# Patient Record
Sex: FEMALE | Race: WHITE | Employment: OTHER | URBAN - METROPOLITAN AREA
[De-identification: names, ages, dates, MRNs, and addresses within clinical notes are randomized per-mention and may not be internally consistent; named-entity substitution may affect disease eponyms.]

---

## 2017-03-21 ENCOUNTER — HOSPITAL ENCOUNTER (OUTPATIENT)
Dept: MRI IMAGING | Facility: CLINIC | Age: 82
Discharge: HOME | End: 2017-03-21
Attending: INTERNAL MEDICINE | Admitting: INTERNAL MEDICINE
Payer: MEDICARE

## 2017-03-21 DIAGNOSIS — C85.99 OCULAR LYMPHOMA (H): ICD-10-CM

## 2017-03-21 LAB
CREAT BLD-MCNC: 1 MG/DL (ref 0.52–1.04)
GFR SERPL CREATININE-BSD FRML MDRD: 52 ML/MIN/1.7M2

## 2017-03-21 PROCEDURE — 25500064 ZZH RX 255 OP 636: Performed by: INTERNAL MEDICINE

## 2017-03-21 PROCEDURE — A9585 GADOBUTROL INJECTION: HCPCS | Performed by: INTERNAL MEDICINE

## 2017-03-21 PROCEDURE — 70553 MRI BRAIN STEM W/O & W/DYE: CPT

## 2017-03-21 PROCEDURE — 82565 ASSAY OF CREATININE: CPT

## 2017-03-21 RX ORDER — GADOBUTROL 604.72 MG/ML
5 INJECTION INTRAVENOUS ONCE
Status: COMPLETED | OUTPATIENT
Start: 2017-03-21 | End: 2017-03-21

## 2017-03-21 RX ADMIN — GADOBUTROL 5 ML: 604.72 INJECTION INTRAVENOUS at 20:16

## 2019-01-22 ENCOUNTER — RECORDS - HEALTHEAST (OUTPATIENT)
Dept: LAB | Facility: CLINIC | Age: 84
End: 2019-01-22

## 2019-01-22 LAB
ALBUMIN UR-MCNC: ABNORMAL MG/DL
APPEARANCE UR: ABNORMAL
BACTERIA #/AREA URNS HPF: ABNORMAL HPF
BILIRUB UR QL STRIP: NEGATIVE
CAOX CRY #/AREA URNS HPF: PRESENT /[HPF]
COLOR UR AUTO: YELLOW
GLUCOSE UR STRIP-MCNC: NEGATIVE MG/DL
HGB UR QL STRIP: NEGATIVE
KETONES UR STRIP-MCNC: NEGATIVE MG/DL
LEUKOCYTE ESTERASE UR QL STRIP: NEGATIVE
MUCOUS THREADS #/AREA URNS LPF: ABNORMAL LPF
NITRATE UR QL: NEGATIVE
PH UR STRIP: 5.5 [PH] (ref 4.5–8)
RBC #/AREA URNS AUTO: ABNORMAL HPF
SP GR UR STRIP: 1.02 (ref 1–1.03)
SQUAMOUS #/AREA URNS AUTO: ABNORMAL LPF
UROBILINOGEN UR STRIP-ACNC: ABNORMAL
WBC #/AREA URNS AUTO: ABNORMAL HPF

## 2019-01-23 LAB — BACTERIA SPEC CULT: NO GROWTH

## 2019-05-30 ENCOUNTER — APPOINTMENT (OUTPATIENT)
Dept: MRI IMAGING | Facility: CLINIC | Age: 84
DRG: 840 | End: 2019-05-30
Attending: EMERGENCY MEDICINE
Payer: COMMERCIAL

## 2019-05-30 ENCOUNTER — HOSPITAL ENCOUNTER (INPATIENT)
Facility: CLINIC | Age: 84
LOS: 1 days | Discharge: HOSPICE | DRG: 840 | End: 2019-05-31
Attending: EMERGENCY MEDICINE | Admitting: INTERNAL MEDICINE
Payer: COMMERCIAL

## 2019-05-30 ENCOUNTER — APPOINTMENT (OUTPATIENT)
Dept: GENERAL RADIOLOGY | Facility: CLINIC | Age: 84
DRG: 840 | End: 2019-05-30
Attending: EMERGENCY MEDICINE
Payer: COMMERCIAL

## 2019-05-30 ENCOUNTER — APPOINTMENT (OUTPATIENT)
Dept: CT IMAGING | Facility: CLINIC | Age: 84
DRG: 840 | End: 2019-05-30
Attending: EMERGENCY MEDICINE
Payer: COMMERCIAL

## 2019-05-30 DIAGNOSIS — D49.6 BRAIN TUMOR (H): ICD-10-CM

## 2019-05-30 DIAGNOSIS — C83.390 CNS LYMPHOMA: Primary | ICD-10-CM

## 2019-05-30 LAB
ALBUMIN SERPL-MCNC: 3.9 G/DL (ref 3.4–5)
ALBUMIN UR-MCNC: 10 MG/DL
ALP SERPL-CCNC: 95 U/L (ref 40–150)
ALT SERPL W P-5'-P-CCNC: 56 U/L (ref 0–50)
ANION GAP SERPL CALCULATED.3IONS-SCNC: 8 MMOL/L (ref 3–14)
APPEARANCE UR: ABNORMAL
AST SERPL W P-5'-P-CCNC: 39 U/L (ref 0–45)
BACTERIA #/AREA URNS HPF: ABNORMAL /HPF
BASOPHILS # BLD AUTO: 0 10E9/L (ref 0–0.2)
BASOPHILS NFR BLD AUTO: 0.2 %
BILIRUB SERPL-MCNC: 0.6 MG/DL (ref 0.2–1.3)
BILIRUB UR QL STRIP: NEGATIVE
BUN SERPL-MCNC: 25 MG/DL (ref 7–30)
CALCIUM SERPL-MCNC: 9.4 MG/DL (ref 8.5–10.1)
CHLORIDE SERPL-SCNC: 107 MMOL/L (ref 94–109)
CO2 SERPL-SCNC: 25 MMOL/L (ref 20–32)
COLOR UR AUTO: YELLOW
CREAT SERPL-MCNC: 0.98 MG/DL (ref 0.52–1.04)
DIFFERENTIAL METHOD BLD: NORMAL
EOSINOPHIL # BLD AUTO: 0.2 10E9/L (ref 0–0.7)
EOSINOPHIL NFR BLD AUTO: 1.5 %
ERYTHROCYTE [DISTWIDTH] IN BLOOD BY AUTOMATED COUNT: 14.3 % (ref 10–15)
GFR SERPL CREATININE-BSD FRML MDRD: 50 ML/MIN/{1.73_M2}
GLUCOSE SERPL-MCNC: 91 MG/DL (ref 70–99)
GLUCOSE UR STRIP-MCNC: NEGATIVE MG/DL
HCT VFR BLD AUTO: 44.5 % (ref 35–47)
HGB BLD-MCNC: 15.3 G/DL (ref 11.7–15.7)
HGB UR QL STRIP: NEGATIVE
HYALINE CASTS #/AREA URNS LPF: 3 /LPF (ref 0–2)
IMM GRANULOCYTES # BLD: 0 10E9/L (ref 0–0.4)
IMM GRANULOCYTES NFR BLD: 0.2 %
INTERPRETATION ECG - MUSE: NORMAL
KETONES UR STRIP-MCNC: NEGATIVE MG/DL
LEUKOCYTE ESTERASE UR QL STRIP: ABNORMAL
LYMPHOCYTES # BLD AUTO: 1.8 10E9/L (ref 0.8–5.3)
LYMPHOCYTES NFR BLD AUTO: 16.4 %
MCH RBC QN AUTO: 31.2 PG (ref 26.5–33)
MCHC RBC AUTO-ENTMCNC: 34.4 G/DL (ref 31.5–36.5)
MCV RBC AUTO: 91 FL (ref 78–100)
MONOCYTES # BLD AUTO: 1 10E9/L (ref 0–1.3)
MONOCYTES NFR BLD AUTO: 8.8 %
MUCOUS THREADS #/AREA URNS LPF: PRESENT /LPF
NEUTROPHILS # BLD AUTO: 7.9 10E9/L (ref 1.6–8.3)
NEUTROPHILS NFR BLD AUTO: 72.9 %
NITRATE UR QL: NEGATIVE
NRBC # BLD AUTO: 0 10*3/UL
NRBC BLD AUTO-RTO: 0 /100
PH UR STRIP: 6 PH (ref 5–7)
PLATELET # BLD AUTO: 248 10E9/L (ref 150–450)
POTASSIUM SERPL-SCNC: 4.3 MMOL/L (ref 3.4–5.3)
PROT SERPL-MCNC: 8.1 G/DL (ref 6.8–8.8)
RBC # BLD AUTO: 4.9 10E12/L (ref 3.8–5.2)
RBC #/AREA URNS AUTO: 1 /HPF (ref 0–2)
SODIUM SERPL-SCNC: 140 MMOL/L (ref 133–144)
SOURCE: ABNORMAL
SP GR UR STRIP: 1.02 (ref 1–1.03)
SQUAMOUS #/AREA URNS AUTO: 1 /HPF (ref 0–1)
TROPONIN I SERPL-MCNC: <0.015 UG/L (ref 0–0.04)
UROBILINOGEN UR STRIP-MCNC: NORMAL MG/DL (ref 0–2)
WBC # BLD AUTO: 10.8 10E9/L (ref 4–11)
WBC #/AREA URNS AUTO: 5 /HPF (ref 0–5)

## 2019-05-30 PROCEDURE — A9585 GADOBUTROL INJECTION: HCPCS | Performed by: EMERGENCY MEDICINE

## 2019-05-30 PROCEDURE — 71046 X-RAY EXAM CHEST 2 VIEWS: CPT

## 2019-05-30 PROCEDURE — 81001 URINALYSIS AUTO W/SCOPE: CPT | Performed by: EMERGENCY MEDICINE

## 2019-05-30 PROCEDURE — 85025 COMPLETE CBC W/AUTO DIFF WBC: CPT | Performed by: EMERGENCY MEDICINE

## 2019-05-30 PROCEDURE — 25000128 H RX IP 250 OP 636: Performed by: EMERGENCY MEDICINE

## 2019-05-30 PROCEDURE — 84484 ASSAY OF TROPONIN QUANT: CPT | Performed by: EMERGENCY MEDICINE

## 2019-05-30 PROCEDURE — 70553 MRI BRAIN STEM W/O & W/DYE: CPT

## 2019-05-30 PROCEDURE — 25800030 ZZH RX IP 258 OP 636: Performed by: EMERGENCY MEDICINE

## 2019-05-30 PROCEDURE — 25500064 ZZH RX 255 OP 636: Performed by: EMERGENCY MEDICINE

## 2019-05-30 PROCEDURE — 12000000 ZZH R&B MED SURG/OB

## 2019-05-30 PROCEDURE — 99223 1ST HOSP IP/OBS HIGH 75: CPT | Mod: AI | Performed by: INTERNAL MEDICINE

## 2019-05-30 PROCEDURE — 93005 ELECTROCARDIOGRAM TRACING: CPT

## 2019-05-30 PROCEDURE — 96375 TX/PRO/DX INJ NEW DRUG ADDON: CPT

## 2019-05-30 PROCEDURE — 96361 HYDRATE IV INFUSION ADD-ON: CPT

## 2019-05-30 PROCEDURE — 80053 COMPREHEN METABOLIC PANEL: CPT | Performed by: EMERGENCY MEDICINE

## 2019-05-30 PROCEDURE — 96374 THER/PROPH/DIAG INJ IV PUSH: CPT | Mod: 59

## 2019-05-30 PROCEDURE — 99285 EMERGENCY DEPT VISIT HI MDM: CPT | Mod: 25

## 2019-05-30 PROCEDURE — 70450 CT HEAD/BRAIN W/O DYE: CPT

## 2019-05-30 RX ORDER — ACETAMINOPHEN 500 MG
500-1000 TABLET ORAL 3 TIMES DAILY PRN
COMMUNITY

## 2019-05-30 RX ORDER — LORAZEPAM 2 MG/ML
0.25 INJECTION INTRAMUSCULAR ONCE
Status: COMPLETED | OUTPATIENT
Start: 2019-05-30 | End: 2019-05-30

## 2019-05-30 RX ORDER — NALOXONE HYDROCHLORIDE 0.4 MG/ML
.1-.4 INJECTION, SOLUTION INTRAMUSCULAR; INTRAVENOUS; SUBCUTANEOUS
Status: DISCONTINUED | OUTPATIENT
Start: 2019-05-30 | End: 2019-05-31 | Stop reason: HOSPADM

## 2019-05-30 RX ORDER — ACETAMINOPHEN 500 MG
500-1000 TABLET ORAL 3 TIMES DAILY PRN
Status: DISCONTINUED | OUTPATIENT
Start: 2019-05-30 | End: 2019-05-31 | Stop reason: HOSPADM

## 2019-05-30 RX ORDER — TEMAZEPAM 15 MG/1
15 CAPSULE ORAL
COMMUNITY

## 2019-05-30 RX ORDER — AMOXICILLIN 250 MG
2 CAPSULE ORAL 2 TIMES DAILY PRN
Status: DISCONTINUED | OUTPATIENT
Start: 2019-05-30 | End: 2019-05-31 | Stop reason: HOSPADM

## 2019-05-30 RX ORDER — HYDROMORPHONE HYDROCHLORIDE 1 MG/ML
0.2 INJECTION, SOLUTION INTRAMUSCULAR; INTRAVENOUS; SUBCUTANEOUS ONCE
Status: COMPLETED | OUTPATIENT
Start: 2019-05-30 | End: 2019-05-30

## 2019-05-30 RX ORDER — TEMAZEPAM 15 MG/1
15 CAPSULE ORAL
Status: DISCONTINUED | OUTPATIENT
Start: 2019-05-30 | End: 2019-05-31 | Stop reason: HOSPADM

## 2019-05-30 RX ORDER — POLYETHYLENE GLYCOL 3350 17 G/17G
17 POWDER, FOR SOLUTION ORAL DAILY PRN
Status: DISCONTINUED | OUTPATIENT
Start: 2019-05-30 | End: 2019-05-31 | Stop reason: HOSPADM

## 2019-05-30 RX ORDER — AMOXICILLIN 250 MG
1 CAPSULE ORAL 2 TIMES DAILY PRN
Status: DISCONTINUED | OUTPATIENT
Start: 2019-05-30 | End: 2019-05-31 | Stop reason: HOSPADM

## 2019-05-30 RX ORDER — LOSARTAN POTASSIUM 100 MG/1
100 TABLET ORAL DAILY
COMMUNITY

## 2019-05-30 RX ORDER — BISACODYL 10 MG
10 SUPPOSITORY, RECTAL RECTAL DAILY PRN
Status: DISCONTINUED | OUTPATIENT
Start: 2019-05-30 | End: 2019-05-31 | Stop reason: HOSPADM

## 2019-05-30 RX ORDER — PROCHLORPERAZINE MALEATE 5 MG
5 TABLET ORAL EVERY 6 HOURS PRN
Status: DISCONTINUED | OUTPATIENT
Start: 2019-05-30 | End: 2019-05-31 | Stop reason: HOSPADM

## 2019-05-30 RX ORDER — CITALOPRAM HYDROBROMIDE 20 MG/1
20 TABLET ORAL EVERY EVENING
Status: DISCONTINUED | OUTPATIENT
Start: 2019-05-30 | End: 2019-05-31 | Stop reason: HOSPADM

## 2019-05-30 RX ORDER — CITALOPRAM HYDROBROMIDE 20 MG/1
20 TABLET ORAL EVERY EVENING
COMMUNITY

## 2019-05-30 RX ORDER — GADOBUTROL 604.72 MG/ML
5 INJECTION INTRAVENOUS ONCE
Status: COMPLETED | OUTPATIENT
Start: 2019-05-30 | End: 2019-05-30

## 2019-05-30 RX ORDER — PROCHLORPERAZINE 25 MG
12.5 SUPPOSITORY, RECTAL RECTAL EVERY 12 HOURS PRN
Status: DISCONTINUED | OUTPATIENT
Start: 2019-05-30 | End: 2019-05-31 | Stop reason: HOSPADM

## 2019-05-30 RX ORDER — LOSARTAN POTASSIUM 100 MG/1
100 TABLET ORAL DAILY
Status: DISCONTINUED | OUTPATIENT
Start: 2019-05-31 | End: 2019-05-31 | Stop reason: HOSPADM

## 2019-05-30 RX ORDER — ASPIRIN 81 MG/1
81 TABLET, CHEWABLE ORAL DAILY
Status: DISCONTINUED | OUTPATIENT
Start: 2019-05-31 | End: 2019-05-31 | Stop reason: HOSPADM

## 2019-05-30 RX ORDER — ACETAMINOPHEN 500 MG
500 TABLET ORAL AT BEDTIME
Status: DISCONTINUED | OUTPATIENT
Start: 2019-05-30 | End: 2019-05-31 | Stop reason: HOSPADM

## 2019-05-30 RX ORDER — ACETAMINOPHEN 500 MG
500 TABLET ORAL AT BEDTIME
COMMUNITY

## 2019-05-30 RX ORDER — ONDANSETRON 4 MG/1
4 TABLET, ORALLY DISINTEGRATING ORAL EVERY 6 HOURS PRN
Status: DISCONTINUED | OUTPATIENT
Start: 2019-05-30 | End: 2019-05-31 | Stop reason: HOSPADM

## 2019-05-30 RX ORDER — ASPIRIN 81 MG/1
81 TABLET, CHEWABLE ORAL DAILY
COMMUNITY

## 2019-05-30 RX ORDER — ONDANSETRON 2 MG/ML
4 INJECTION INTRAMUSCULAR; INTRAVENOUS EVERY 6 HOURS PRN
Status: DISCONTINUED | OUTPATIENT
Start: 2019-05-30 | End: 2019-05-31 | Stop reason: HOSPADM

## 2019-05-30 RX ORDER — LIDOCAINE 40 MG/G
CREAM TOPICAL
Status: DISCONTINUED | OUTPATIENT
Start: 2019-05-30 | End: 2019-05-31 | Stop reason: HOSPADM

## 2019-05-30 RX ORDER — AMLODIPINE BESYLATE 10 MG/1
10 TABLET ORAL EVERY EVENING
COMMUNITY

## 2019-05-30 RX ORDER — SODIUM CHLORIDE 9 MG/ML
1000 INJECTION, SOLUTION INTRAVENOUS CONTINUOUS
Status: DISCONTINUED | OUTPATIENT
Start: 2019-05-30 | End: 2019-05-30

## 2019-05-30 RX ORDER — AMLODIPINE BESYLATE 10 MG/1
10 TABLET ORAL EVERY EVENING
Status: DISCONTINUED | OUTPATIENT
Start: 2019-05-30 | End: 2019-05-31 | Stop reason: HOSPADM

## 2019-05-30 RX ADMIN — GADOBUTROL 5 ML: 604.72 INJECTION INTRAVENOUS at 21:22

## 2019-05-30 RX ADMIN — SODIUM CHLORIDE 1000 ML: 9 INJECTION, SOLUTION INTRAVENOUS at 18:30

## 2019-05-30 RX ADMIN — LORAZEPAM 0.25 MG: 2 INJECTION, SOLUTION INTRAMUSCULAR; INTRAVENOUS at 20:22

## 2019-05-30 RX ADMIN — HYDROMORPHONE HYDROCHLORIDE 0.2 MG: 1 INJECTION, SOLUTION INTRAMUSCULAR; INTRAVENOUS; SUBCUTANEOUS at 19:45

## 2019-05-30 RX ADMIN — SODIUM CHLORIDE 1000 ML: 9 INJECTION, SOLUTION INTRAVENOUS at 19:45

## 2019-05-30 ASSESSMENT — ENCOUNTER SYMPTOMS
WEAKNESS: 1
APPETITE CHANGE: 1
NUMBNESS: 1
NAUSEA: 1
ABDOMINAL PAIN: 0

## 2019-05-30 ASSESSMENT — MIFFLIN-ST. JEOR
SCORE: 898.96
SCORE: 885.35

## 2019-05-30 NOTE — ED PROVIDER NOTES
History     Chief Complaint:  Unilateral Numbness      HPI   Angela Peralta is a 89 year old female with a history of hypertension, hyperlipidemia, non-Hodgkin's lymphoma, brain cancer, TIA, and stroke who presents to the ED for evaluation of unilateral numbness. The patient's son reports that she currently resides at [facility] and has been getting progressively weaker over the past several weeks. A couple of days ago, she developed some right leg weakness and has since been dragging her foot while ambulating. This is associated with some nausea, decreased appetite, and generalized malaise over the past few days. Today, the patient notes that she also developed some right-sided facial numbness from her forehead down to her chin. Thus, they presented to the ED secondary to her worsening symptoms. Here in the ED, the patient denies any left-sided symptoms, abdominal pain, or any other complaints. She is not currently anticoagulated aside from aspirin.     Allergies:  Enalapril  Pine Nuts    Medications:    Aspirin  Lipitor  Microzide  Nifedipine  Klor-Con  Ambien     Past Medical History:    Hyperlipidemia  Hypertension  Macular degeneration  Depression  Pulmonary nodule  GERD  Squamous cell cancer of buccal mucosa  Stroke  Lymphoma    Past Surgical History:    ENT surgery   surgery  Hysterectomy   Vaginal prolapse repair  Phacoemulsification clear cornea with standard IOL, bilateral    Family History:    Hyperlipidemia  Breast cancer  Hypertension  Mesothelioma    Social History:  Negative for tobacco use.  Alcohol Use: Yes   Negative for drug use.   Marital Status:   [2]  Currently resides at [facility].      Review of Systems   Constitutional: Positive for appetite change.   Gastrointestinal: Positive for nausea. Negative for abdominal pain.   Neurological: Positive for weakness (Right side) and numbness (Right face).   All other systems reviewed and are  "negative.      Physical Exam     Physical Exam    Patient Vitals for the past 24 hrs:   BP Temp Temp src Pulse Resp SpO2 Height Weight   05/30/19 2352 148/80 98.9  F (37.2  C) Oral 75 18 96 % -- 51.7 kg (114 lb)   05/30/19 2255 -- -- -- -- -- 95 % -- --   05/30/19 2250 128/60 -- -- 80 -- 95 % -- --   05/30/19 2035 -- -- -- -- -- 94 % -- --   05/30/19 2030 -- -- -- -- -- 95 % -- --   05/30/19 2000 -- -- -- -- -- 95 % -- --   05/30/19 1930 -- -- -- -- -- 97 % -- --   05/30/19 1900 -- -- -- -- -- 97 % -- --   05/30/19 1830 166/75 -- -- -- -- -- -- --   05/30/19 1800 166/83 -- -- -- -- -- -- --   05/30/19 1607 178/75 98.6  F (37  C) -- 67 18 99 % 1.575 m (5' 2\") 53.1 kg (117 lb)       General: Alert and Interactive.   Head: No signs of trauma.   Mouth/Throat: Oropharynx is clear and moist. Tongue deviates to the midline. Smile is symmetric. Palate elevates symmetrically.   Eyes: Conjunctivae are normal. Pupils are equal, round, and reactive to light. Extraocular movements intact.  Neck: Normal range of motion. No nuchal rigidity.   CV: Normal rate and regular rhythm.    Resp: Effort normal and breath sounds normal. No respiratory distress.   GI: Soft. There is no tenderness or guarding.   MSK: Normal range of motion. no edema.   Neuro: The patient is alert and oriented to person, place, and time.  PERRLA, EOMI, strength in upper/lower extremities symmetrical. Strength is symmetric. Patient complains of subjective numbness to the right side of the face involving the forehead and cheek. Tongue deviates to the midline. Smile is symmetric. Palate elevates symmetrically.   Sensation normal. Speech normal.  GCS eye subscore is 4. GCS verbal subscore is 5. GCS motor subscore is 6.   Skin: Skin is warm and dry. No rash noted.   Psych: normal mood and affect. behavior is normal.     Emergency Department Course   ECG:  Indication: Unilateral numbness  Time: 1735  Vent. Rate 76 bpm. WY interval 218. QRS duration 100. QT/QTc " 396/445. P-R-T axis 60 -5 29.  Sinus rhythm with 1st degree AV block. Possible inferior infarct, age undetermined. Cannot rule out anterior infarct, age undetermined. ST & T wave abnormality, consider lateral ischemia. Abnormal ECG. Read time: 1735     Imaging:  Radiographic findings were communicated with the patient and family who voiced understanding of the findings.  CT Head without contrast:   1. Probable recent ischemic infarct involving the superior aspect of the right cerebellar hemisphere although edema from tumor or infection cannot be completely excluded. Brain MRI with contrast may be helpful for further evaluation.  2. Diffuse cerebral volume loss and cerebral white matter change consistent with chronic small vessel ischemic disease.    Radiation dose for this scan was reduced using automated exposure control, adjustment of the mA and/or kV according to patient size, or iterative reconstruction technique.  As per radiology.    XR Chest 2 views:   Right lateral lung base granuloma as before. No new focal pulmonary opacities otherwise. No pleural effusions or pneumothorax. Stable heart and mediastinum.   As per radiology.     MR Brain w/o and w/ contrast:   1. Enhancing 4.2 x 2.2 x roughly 3.0 cm enhancing mass in the superior aspect of the right cerebral hemisphere extending through the middle cerebellar peduncle to involve the right hemipons. Differential  diagnosis includes primary brain tumor (glioma or lymphoma) versus metastatic disease.  2. Diffuse cerebral volume loss and cerebral white matter changes consistent with chronic small vessel ischemic disease, as per radiology    Laboratory:  CBC: WBC: 10.8, HGB: 15.3, PLT: 248  CMP: ALT 56 (H), GFR 50 (L), o/w WNL (Creatinine: 0.98)    1645 Troponin: <0.015      UA with Microscopic: Leukocyte esterase trace (A), Bacteria many (A), Mucous present (A), Hyaline casts 3 (H), Protein albumin 10 (A), o/w WNL     Interventions:  1830 NS 1L IV   1945 NS 1L IV  infusion   Dilaudid 0.2 mg IV  2022 Ativan 0.25 mg IV    Emergency Department Course:  Nursing notes and vitals reviewed. (1656) I performed an exam of the patient as documented above.     IV inserted. Medicine administered as documented above. Blood drawn. This was sent to the lab for further testing, results above.    EKG obtained in the ED, see results above.     The patient was sent for a chest x-ray and head CT while in the emergency department, findings above.     The patient provided a urine sample here in the emergency department. This was sent for laboratory testing, findings above.      (1851) I rechecked the patient and discussed the results of her workup thus far. At this time she declined a brain MRI. The patient informed me that she was initially diagnosed with her brain tumor in 2017 in [place]. This spontaneously regressed, although the patient was told that it may return in the future.     (1936) I reevaluated the patient, who consented to a brain MRI at this time. This was obtained, findings above.     (2228)  I consulted with Dr. Carrasquillo of the hospitalist services. They are in agreement to accept the patient for admission.    Findings and plan explained to the Patient who consents to admission. Discussed the patient with Dr. Carrasquillo, who will admit the patient to an inpatient neuro bed for further monitoring, evaluation, and treatment.    Impression & Plan      Medical Decision Making:  Angela Peralta is a 89 year old female who is presenting to the ER with numbness in the setting of right foot drop developing over he last few days. The patient has a history of a brain tumor that the son says has regressed and is no longer present to their knowledge, but the patient is in the ED complaining of right-sided facial numbness. The initial CT scan shows what could be a stroke vs a mass with edema. MRI was recommended to further evaluate. MRI with and without contrast reveals a tumor in the right  cerebellum. This is likely causing her symptoms and is in the exact same place the previous tumor was located. The family is struggling with what to do as far as treatment and care for this patient. She is going to require admission to coordinate further evaluation and treatment in case due to the ongoing and new symptoms.    Diagnosis:    ICD-10-CM    1. Brain tumor (H) D49.6        Disposition:  Admitted to Dr. Carrasquillo      Scribe Disclosure:  I, Carito Bains, am serving as a scribe on 5/30/2019 at 4:56 PM to personally document services performed by Francesco Francis MD based on my observations and the provider's statements to me.      Carito Bains  5/30/2019    EMERGENCY DEPARTMENT       Francesco Francis MD  05/31/19 0749

## 2019-05-30 NOTE — ED NOTES
"[facility]  ED Nurse Handoff Report    ED Chief complaint: Numbness      ED Diagnosis:   Final diagnoses:   Cerebellar stroke (H)       Code Status: not discussed    Allergies:   Allergies   Allergen Reactions     Enalapril      No Clinical Screening - See Comments      Pine nuts       Activity level - Baseline/Home:  Independent- with walker    Activity Level - Current:   Stand with Assist- right leg more weak than before     Needed?: No    Isolation: No  Infection: Not Applicable  Bariatric?: No    Vital Signs:   Vitals:    05/30/19 1607   BP: 178/75   Pulse: 67   Resp: 18   Temp: 98.6  F (37  C)   SpO2: 99%   Weight: 53.1 kg (117 lb)   Height: 1.575 m (5' 2\")       Cardiac Rhythm: ,        Pain level:      Is this patient confused?: No   Does this patient have a guardian?  No         If yes, is there guardianship documents in the Epic \"Code/ACP\" activity?  N/A         Guardian Notified?  N/A  [place] - Suicide Severity Rating Scale Completed?  Yes  If yes, what color did the patient score?  White    Patient Report: Initial Complaint: numbness  Focused Assessment: pt has new numbness to right leg and right side of face. Per pt's son she is dragging her right foot some when she walks. CT scan shows new ischemia. Pt also hasn't been eating as much as normal the past 2 days.   Tests Performed: labs, xray, ct  Abnormal Results: CT  Labs Ordered and Resulted from Time of ED Arrival Up to the Time of Departure from the ED   COMPREHENSIVE METABOLIC PANEL - Abnormal; Notable for the following components:       Result Value    GFR Estimate 50 (*)     GFR Estimate If Black 58 (*)     ALT 56 (*)     All other components within normal limits   ROUTINE UA WITH MICROSCOPIC REFLEX TO CULTURE - Abnormal; Notable for the following components:    Protein Albumin Urine 10 (*)     Leukocyte Esterase Urine Trace (*)     Bacteria Urine Many (*)     Mucous Urine Present (*)     Hyaline Casts 3 (*)     All " other components within normal limits   CBC WITH PLATELETS DIFFERENTIAL   TROPONIN I   PULSE OXIMETRY NURSING   CARDIAC CONTINUOUS MONITORING   VITAL SIGNS       Treatments provided: fluids    Family Comments: son at bedside    OBS brochure/video discussed/provided to patient/family: N/A              Name of person given brochure if not patient:               Relationship to patient:     ED Medications:   Medications   0.9% sodium chloride BOLUS (1,000 mLs Intravenous New Bag 5/30/19 9450)     Followed by   sodium chloride 0.9% infusion (has no administration in time range)       Drips infusing?:  Yes    For the majority of the shift this patient was Green.   Interventions performed were n/a.    Severe Sepsis OR Septic Shock Diagnosis Present: No    To be done/followed up on inpatient unit:      ED NURSE PHONE NUMBER: [phone]

## 2019-05-30 NOTE — ED TRIAGE NOTES
Pt lives [facility] called son today to bring pt in has some decrease weakness in right leg for 2 days with right cheek numbness today and decrease mobility.  Nausea for 2 days with decrease appetite

## 2019-05-31 VITALS
HEIGHT: 62 IN | RESPIRATION RATE: 16 BRPM | TEMPERATURE: 98.8 F | SYSTOLIC BLOOD PRESSURE: 125 MMHG | OXYGEN SATURATION: 95 % | BODY MASS INDEX: 20.98 KG/M2 | WEIGHT: 114 LBS | DIASTOLIC BLOOD PRESSURE: 69 MMHG | HEART RATE: 65 BPM

## 2019-05-31 PROCEDURE — 25000132 ZZH RX MED GY IP 250 OP 250 PS 637: Performed by: INTERNAL MEDICINE

## 2019-05-31 PROCEDURE — 25000128 H RX IP 250 OP 636: Performed by: NURSE PRACTITIONER

## 2019-05-31 PROCEDURE — 99238 HOSP IP/OBS DSCHRG MGMT 30/<: CPT | Mod: GW | Performed by: INTERNAL MEDICINE

## 2019-05-31 RX ORDER — DEXAMETHASONE SODIUM PHOSPHATE 4 MG/ML
4 INJECTION, SOLUTION INTRA-ARTICULAR; INTRALESIONAL; INTRAMUSCULAR; INTRAVENOUS; SOFT TISSUE ONCE
Status: COMPLETED | OUTPATIENT
Start: 2019-05-31 | End: 2019-05-31

## 2019-05-31 RX ORDER — DEXAMETHASONE 4 MG/1
4 TABLET ORAL EVERY 12 HOURS SCHEDULED
Status: DISCONTINUED | OUTPATIENT
Start: 2019-05-31 | End: 2019-05-31 | Stop reason: HOSPADM

## 2019-05-31 RX ORDER — DEXAMETHASONE 4 MG/1
4 TABLET ORAL EVERY 12 HOURS
Qty: 60 TABLET | Refills: 0 | Status: SHIPPED | OUTPATIENT
Start: 2019-05-31

## 2019-05-31 RX ADMIN — LOSARTAN POTASSIUM 100 MG: 100 TABLET ORAL at 08:53

## 2019-05-31 RX ADMIN — DEXAMETHASONE SODIUM PHOSPHATE 4 MG: 4 INJECTION, SOLUTION INTRAMUSCULAR; INTRAVENOUS at 14:20

## 2019-05-31 RX ADMIN — ACETAMINOPHEN 500 MG: 500 TABLET, FILM COATED ORAL at 00:05

## 2019-05-31 RX ADMIN — AMLODIPINE BESYLATE 10 MG: 10 TABLET ORAL at 00:05

## 2019-05-31 RX ADMIN — CITALOPRAM HYDROBROMIDE 20 MG: 20 TABLET ORAL at 00:05

## 2019-05-31 RX ADMIN — ASPIRIN 81 MG 81 MG: 81 TABLET ORAL at 08:53

## 2019-05-31 RX ADMIN — OXYCODONE HYDROCHLORIDE 2.5 MG: 5 TABLET ORAL at 03:48

## 2019-05-31 ASSESSMENT — ACTIVITIES OF DAILY LIVING (ADL)
ADLS_ACUITY_SCORE: 19
ADLS_ACUITY_SCORE: 18
ADLS_ACUITY_SCORE: 19

## 2019-05-31 NOTE — DISCHARGE SUMMARY
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          Discharge Disposition   Discharged to assisted living and will enroll in hospice when she arrives  Condition at discharge: Stable    Hospital Course   Angela Peralta is a 89 year old female admitted on 5/30/2019. 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  Imaging findings appear to have improved as of April 2018, June 2018 MR imaging through [facility] system suggestive of spontaneous regression.  Now with approximately 2 days of ataxia found to have again worsening imaging findings.  Historically patient and family have opted against formal diagnosis through tissue/cellular evaluation as well as treatment.   - Patient and family are interested in comfort/hospice care approach  - Oncology was consulted for further management of symptoms. After discussion with the patient/son by oncology, decision was made to start her on dexamethasone.  She'll receive dexamethasone 4 mg IV ×1, then Decadron 4 mg oral twice daily.  - She'll discharged to the [facility] and enroll in hospice.   Hospice will meet her at her facility.        Chronic medical issues:  Essential hypertension: Losartan 100 mg daily, amlodipine 10 mg daily  Hyperlipidemia: Prior to admission atorvastatin 40 mg daily discontinued given plan for comfort care and hospice enrollment  Cerebrovascular disease with stroke in 1996, TIA 2001  -Continue daily aspirin 81 mg   -atorvastatin discontinued  Insomnia:  -Restoril 15mg at bedtime as needed  Dysphagia: Given goals, comfort care approach, diet as tolerated    Consultations This Hospital Stay   HEMATOLOGY & ONCOLOGY IP CONSULT  SOCIAL WORK IP CONSULT    Code Status   DNR/DNI    Time Spent on this Encounter   I, Monet Roblero, personally saw the patient today and spent less than or equal to 30 minutes discharging this patient.       Monet Roblero MD  [facility]  ______________________________________________________________________    Physical Exam   Vital Signs: Temp: 98.8  F (37.1  C) Temp src: Oral BP: 125/69 Pulse: 65   Resp: 16 SpO2: 95 % O2 Device: None (Room air)    Weight: 114 lbs 0 oz  General Appearance: Alert, awake and no apparent distress  Respiratory: clear to ausculation bialterally  Cardiovascular: regular rate and rhythm  GI: soft and non-tender        Primary Care Physician   José Recinos    Discharge Orders      Follow-up and recommended labs and tests     Enroll patient in hospice     Activity    Your activity upon discharge: activity as tolerated     DNR/DNI     Diet    Follow this diet upon discharge: Orders Placed This Encounter      Combination Diet Regular Diet Adult       Significant Results and Procedures   Most Recent 3 CBC's:  Recent Labs   Lab Test 05/30/19  1645   WBC 10.8   HGB 15.3   MCV 91        Most Recent 3 BMP's:  Recent Labs   Lab Test 05/30/19  1645      POTASSIUM 4.3   CHLORIDE 107   CO2 25   BUN 25   CR 0.98   ANIONGAP 8   ASHLEIGH 9.4   GLC 91   ,   Results for orders placed or performed during the  hospital encounter of 05/30/19   CT Head w/o Contrast    Narrative    CT OF THE HEAD WITHOUT CONTRAST 5/30/2019 6:11 PM     COMPARISON: Brain MRI 3/21/2017.    HISTORY: Focal neuro deficit, > 6 hours, stroke suspected; right  facial numbness, right leg weakness.    TECHNIQUE: 5 mm thick axial CT images of the head were acquired  without IV contrast material.    FINDINGS: There is new hypodensity in the superior medial aspect of  the right cerebellar hemisphere that likely represents a subacute  ischemic infarct although edema from tumor or infection cannot be  completely excluded. There is mild diffuse cerebral volume loss. There  are extensive confluent areas of decreased density in the cerebral  white matter bilaterally that are consistent with sequela of chronic  small vessel ischemic disease.    The ventricles and basal cisterns are within normal limits in  configuration given the degree of cerebral volume loss.  There is no  midline shift. There are no extra-axial fluid collections.    No intracranial hemorrhage, mass or recent infarct.    The visualized paranasal sinuses are well-aerated. There is no  mastoiditis. There are no fractures of the visualized bones.      Impression    IMPRESSION:   1. Probable recent ischemic infarct involving the superior aspect of  the right cerebellar hemisphere although edema from tumor or infection  cannot be completely excluded. Brain MRI with contrast may be helpful  for further evaluation.  2. Diffuse cerebral volume loss and cerebral white matter changes  consistent with chronic small vessel ischemic disease.    Radiation dose for this scan was reduced using automated exposure  control, adjustment of the mA and/or kV according to patient size, or  iterative reconstruction technique.      HUGO DE MD   XR Chest 2 Views    Narrative    XR CHEST 2 VW 5/30/2019 6:02 PM    HISTORY: Weakness.     COMPARISON: August 7, 2013.       Impression    IMPRESSION: Right lateral  lung base granuloma as before. No new focal  pulmonary opacities otherwise. No pleural effusions or pneumothorax.  Stable heart and mediastinum.     SOCORRO MCELROY MD   MR Brain w/o & w Contrast    Narrative    MRI OF THE BRAIN WITHOUT AND WITH CONTRAST 5/30/2019 8:39 PM     COMPARISON: Head CT same day.    HISTORY: Focal neurological deficit, > six hours, stroke suspected;  abnormal CT.     TECHNIQUE: Axial diffusion-weighted with ADC map, axial T2-weighted  with fat saturation, axial T1-weighted, axial turboFLAIR and coronal  T1-weighted images of the brain were acquired without intravenous  contrast. Following intravenous administration of gadolinium (5 mL  Gadavist), axial T1-weighted images of the brain were acquired.     FINDINGS: There is a heterogeneously enhancing, T1 hypointense,  heterogeneously T2 hypointense mass in the superior aspect of the  right cerebellar hemisphere extending through the right middle  cerebellar peduncle to involve the right posterolateral aspect of the  vesna. This mass measures 4.2 x 2.2 cm in the oblique AP and oblique  transverse dimensions respectively and measures roughly 3 cm in the  cephalocaudad dimension. There is vasogenic edema surrounding the  mass. There is no evidence for hemorrhage associated with the mass.  Differential diagnosis includes primary brain tumor (glioma versus  lymphoma) or metastatic disease. No other intracranial lesions noted.    There is moderate diffuse cerebral volume loss. There are extensive  confluent periventricular areas of abnormal T2 signal hyperintensity  in the cerebral white matter bilaterally that are consistent with  sequela of chronic small vessel ischemic disease. The ventricles and  basal cisterns are within normal limits in configuration given the  degree of cerebral volume loss. There is no midline shift. There are  no extra-axial fluid collections. There is no evidence for stroke or  acute intracranial hemorrhage.     There is no  sinusitis or mastoiditis.      Impression    IMPRESSION:   1. Enhancing 4.2 x 2.2 x roughly 3.0 cm enhancing mass in the superior  aspect of the right cerebral hemisphere extending through the middle  cerebellar peduncle to involve the right hemipons. Differential  diagnosis includes primary brain tumor (glioma or lymphoma) versus  metastatic disease.  2. Diffuse cerebral volume loss and cerebral white matter changes  consistent with chronic small vessel ischemic disease.     HUGO DE MD       Discharge Medications   Current Discharge Medication List      START taking these medications    Details   dexamethasone (DECADRON) 4 MG tablet Take 1 tablet (4 mg) by mouth every 12 hours  Qty: 60 tablet, Refills: 0    Associated Diagnoses: CNS lymphoma (H)      order for DME Equipment being ordered: Other:  EZ stand  Treatment Diagnosis: CNS lymphoma  Qty: 1 each, Refills: 0    Associated Diagnoses: CNS lymphoma (H)         CONTINUE these medications which have NOT CHANGED    Details   !! acetaminophen (TYLENOL) 500 MG tablet Take 500 mg by mouth At Bedtime      !! acetaminophen (TYLENOL) 500 MG tablet Take 500-1,000 mg by mouth 3 times daily as needed for mild pain      amLODIPine (NORVASC) 10 MG tablet Take 10 mg by mouth every evening       aspirin (ASA) 81 MG chewable tablet Take 81 mg by mouth daily      citalopram (CELEXA) 20 MG tablet Take 20 mg by mouth every evening      diclofenac (VOLTAREN) 1 % topical gel Place 2 g onto the skin 4 times daily Apply to knees and shoulders.      losartan (COZAAR) 100 MG tablet Take 100 mg by mouth daily      temazepam (RESTORIL) 15 MG capsule Take 15 mg by mouth nightly as needed for sleep       !! - Potential duplicate medications found. Please discuss with provider.      STOP taking these medications       ATORVASTATIN CALCIUM PO Comments:   Reason for Stopping:         Multiple Vitamins-Minerals (PRESERVISION AREDS 2 PO) Comments:   Reason for Stopping:              Allergies   Allergies   Allergen Reactions     Enalapril      No Clinical Screening - See Comments      Pine nuts

## 2019-05-31 NOTE — PROGRESS NOTES
Discharge instructions reviewed and questions answered.  Pt discharge back to [facility] with family.

## 2019-05-31 NOTE — PHARMACY-ADMISSION MEDICATION HISTORY
Admission medication history interview status for the 5/30/2019  admission is complete. See EPIC admission navigator for prior to admission medications     Medication history source reliability:Good    Actions taken by pharmacist (provider contacted, etc):Verified all medications with patient's NH list from the [facility] and verified last doses with patient's nurse.      Additional medication history information not noted on PTA med list :None    Medication reconciliation/reorder completed by provider prior to medication history? No    Time spent in this activity: 25 minutes    Prior to Admission medications    Medication Sig Last Dose Taking? Auth Provider   acetaminophen (TYLENOL) 500 MG tablet Take 500 mg by mouth At Bedtime 5/29/2019 at 1930 Yes Unknown, Entered By History   acetaminophen (TYLENOL) 500 MG tablet Take 500-1,000 mg by mouth 3 times daily as needed for mild pain prn med Yes Unknown, Entered By History   amLODIPine (NORVASC) 10 MG tablet Take 10 mg by mouth every evening  5/29/2019 at 1930 Yes Reported, Patient   aspirin (ASA) 81 MG chewable tablet Take 81 mg by mouth daily 5/30/2019 at 0930 Yes Unknown, Entered By History   ATORVASTATIN CALCIUM PO Take 40 mg by mouth daily  5/30/2019 at 0930 Yes Reported, Patient   citalopram (CELEXA) 20 MG tablet Take 20 mg by mouth every evening 5/29/2019 at 1930 Yes Unknown, Entered By History   diclofenac (VOLTAREN) 1 % topical gel Place 2 g onto the skin 4 times daily Apply to knees and shoulders. 5/30/2019 at 0930 & 1300 Yes Unknown, Entered By History   losartan (COZAAR) 100 MG tablet Take 100 mg by mouth daily 5/30/2019 at 0930 Yes Unknown, Entered By History   Multiple Vitamins-Minerals (PRESERVISION AREDS 2 PO) Take 1 capsule by mouth 2 times daily 5/30/2019 at 0930 Yes Unknown, Entered By History   temazepam (RESTORIL) 15 MG capsule Take 15 mg by mouth nightly as needed for sleep prn med Yes Unknown, Entered By History

## 2019-05-31 NOTE — H&P
"[facility]    History and Physical - Hospitalist Service       Date of Admission:  2019    Assessment & Plan   Angela Peralta is a 89 year old female admitted on 2019. 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  Imaging findings appear to have improved as of 2018, 2018 MR imaging through [employer] suggestive of spontaneous regression.  Now with approximately 2 days of ataxia found to have again worsening imaging findings.  Patient's daughter diagnosed with glioma in her 40s and  from this.  This colors patient's perception of treatment for \"brain tumor,\" and historically patient and family have opted against formal diagnosis through tissue/cellular evaluation as well as treatment.   -Following discussion with patient and son at bedside, have ordered comfort cares for patient  -Social work consulted for hospice evaluation  -I have consulted oncology.  Patient has seen Dr. Eckert of [place] oncology in the past as well as an oncologist through [facility].  I have requested discussion regarding treatments and expected outcomes for possible CNS lymphoma versus expected prognosis with simply symptomatic treatment with steroids in the setting of anticipated enrollment in hospice.   -Holding on steroid administration at this time pending discussion with oncology.  Patient does not have a tissue/cellular diagnosis of CNS lymphoma, only imaging study suggestive of this; note, however, patient has a history of I " believe ocular lymphoma.  Records regarding this are not currently available to me.  If a tissue diagnosis is desired, would be beneficial to hold on steroids first as long as patient remains clinically stable.  -Would be reasonable to prescribe steroids even in the setting of comfort/hospice as steroids may provide partial treatment of suspected CNS lymphoma and potentially improve patient's ataxia  -Acetaminophen, low-dose narcotics available PRN for headache  -Social work consulted for disposition planning as patient will likely need additional cares at the [facility] [place].  Per son, these cares are available  -Seizure precautions    Chronic medical issues:  Essential hypertension: Losartan 100 mg daily, amlodipine 10 mg daily  Hyperlipidemia: Prior to admission atorvastatin 40 mg daily discontinued given plan for comfort care and hospice enrollment  Cerebrovascular disease with stroke in 1996, TIA 2001  -Continue daily aspirin 81 mg   -atorvastatin discontinued  Insomnia:  -Restoril 15mg at bedtime as needed  Dysphasia: Patient with coughing and choking frequently while eating.  This is a long-standing issue.  Discussed limiting diet, though primary goal is for comfort  -Regular diet as tolerated, aspiration precautions.  If patient with distress with coughing and choking while eating, can downgrade diet empirically.      Diet: Regular adult diet, aspiration precautions.    DVT Prophylaxis: Pneumatic Compression Devices  Arceo Catheter: not present  Code Status: DNR/DNI.    Disposition Plan   Expected discharge: 2 - 3 days, recommended to prior living arrangement once safe disposition plan/ TCU bed available.  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   Entered: Andre Carrasquillo MD 05/30/2019, 11:21 PM     The patient's care was discussed with the Patient, Dr Francis in the ER,   Brace of radiology, patient's son at bedside.    Andre Carrasquillo MD  [facility]    ______________________________________________________________________    Chief Complaint   Ataxia/discoordination of right greater than left lower extremity.    History is obtained from the patient, patient's son at bedside, chart review, review of outside records including prior MR imaging of the brain with suspected CNS lymphoma reads.    History of Present Illness   Angela Peralta is a 89 year old female who over the past 2 days has experienced decreased coordination of right greater than left lower extremity and is found to have what appears to be CNS lymphoma with clinical worsening.    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  At that time, it appears that ocular NHL thought to be localized without spread.  In November 2017, patient was in [place] when she had an episode of expressive a fascia.  This prompted evaluation in the emergency department where she was found to have a urinary tract infection as well as an MRI of the brain suggesting CNS lymphoma of the corpus callosum.  [facility]  Patient return to [place], had repeat imaging with MR of the brain through [email or web address] system in April 2018.  Initial read of this appeared to focus on macroadenoma of the pituitary, though following [place] imaging being available, a comment was addended to this initial read describing subsequent improvement in hyperintensity suggestive of lymphoma, though atypical demyelinating disease was apparently also under consideration.  Patient again had a repeat MR to follow in June 2018, this was stable from April study.  During this time, patient was following with an oncologist through [facility].  I do not have these records available, though in  "discussion with son, he tells me that her scan normalized, and they were told to \"buy a lottery ticket.\"    It should be noted that following initial evaluation in [place], patient was actually enrolled in hospice.  Patient's daughter was diagnosed with a brain glioma in her 40s and patient and family recalled emotional course of treatment for her in the setting of brain tumor.  Patient and son refer to current findings as a brain mass/tumor, and to some degree equate daughter's glioma to current concern for CNS lymphoma.  Similarly, son expresses a concern with steroid dosing as in the end stages of patient's daughter's glioma course, she was on steroids and had issues with seizures likely related to increasing intracranial vasogenic edema.     Lengthy discussion with patient as well as son at bedside.  Patient does not have a formal diagnosis of CNS lymphoma other than what was thought to be localized non-Hodgkin's lymphoma of the eye.  I am unclear as to the tissue diagnosis of this, even, as it occurred through an outside system.  Patient had declined further evaluation in the context as above.  Discussed that patient, however, may have some clinical improvement in symptom improvement with steroid treatment, especially if this truly represents a CNS lymphoma.  Prior to initiating steroids, however, I believe a more formal discussion regarding what diagnosis and potential treatment might look like would be indicated, especially as patient is currently stable.  I recommend this discussion prior to steroids as primary concern is for CNS lymphoma, and steroid administration may actually make diagnosis more difficult.    The patient has no pain, and is asymptomatic currently   While laying in bed.  As patient remains stable, son is agreeable to discussion with oncology prior to trial of steroids if patient with continued ataxia.    Review of Systems    The 10 point Review of Systems is negative other than noted in " the [facility] or here.   Patient currently without complaints.  She had a headache earlier today as well as some nausea, though the symptoms are resolved.  Right facial numbness earlier today, currently not noted    Past Medical History    I have reviewed this patient's medical history and updated it with pertinent information if needed.   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  Past Surgical History:   Procedure Laterality Date     COLONOSCOPY       ENT SURGERY       GENITOURINARY SURGERY       GYN SURGERY       HYSTERECTOMY       PHACOEMULSIFICATION CLEAR CORNEA WITH STANDARD INTRAOCULAR LENS IMPLANT Left 8/25/2014    Procedure: PHACOEMULSIFICATION CLEAR CORNEA WITH STANDARD INTRAOCULAR LENS IMPLANT;  Surgeon: David Luu MD;  Location:  [facility]     PHACOEMULSIFICATION CLEAR CORNEA WITH STANDARD INTRAOCULAR LENS IMPLANT Left 8/25/2014    Procedure: PHACOEMULSIFICATION CLEAR CORNEA WITH STANDARD INTRAOCULAR LENS IMPLANT;  Surgeon: David Luu MD;  Location:  [facility]     PHACOEMULSIFICATION CLEAR CORNEA WITH STANDARD INTRAOCULAR LENS IMPLANT Right 8/11/2014    Procedure: PHACOEMULSIFICATION CLEAR CORNEA WITH STANDARD INTRAOCULAR LENS IMPLANT;  Surgeon: David Luu MD;  Location: [facility]       Social History   I have reviewed this patient's social history and updated it with pertinent information if needed.  Social History     Tobacco Use     Smoking status: Never Smoker     Smokeless tobacco: Never Used   Substance Use Topics     Alcohol use: Yes     Comment: wine daily     Drug use: No       Family History   I  have reviewed this patient's family history and updated it with pertinent information if needed.   Daughter with glioma of the brain in her late 40s.    Prior to Admission Medications   Prior to Admission Medications   Prescriptions Last Dose Informant Patient Reported? Taking?   ATORVASTATIN CALCIUM PO 5/30/2019 at 0930 [facility] Yes Yes   Sig: Take 40 mg by mouth daily    Multiple Vitamins-Minerals (PRESERVISION AREDS 2 PO) 5/30/2019 at 0930 [facility] Yes Yes   Sig: Take 1 capsule by mouth 2 times daily   acetaminophen (TYLENOL) 500 MG tablet 5/29/2019 at 1930 [facility] Yes Yes   Sig: Take 500 mg by mouth At Bedtime   acetaminophen (TYLENOL) 500 MG tablet prn med [facility] Yes Yes   Sig: Take 500-1,000 mg by mouth 3 times daily as needed for mild pain   amLODIPine (NORVASC) 10 MG tablet 5/29/2019 at 1930 [facility] Yes Yes   Sig: Take 10 mg by mouth every evening    aspirin (ASA) 81 MG chewable tablet 5/30/2019 at 0930 [facility] Yes Yes   Sig: Take 81 mg by mouth daily   citalopram (CELEXA) 20 MG tablet 5/29/2019 at 1930 [facility] Yes Yes   Sig: Take 20 mg by mouth every evening   diclofenac (VOLTAREN) 1 % topical gel 5/30/2019 at 0930 & 1300 [facility] Yes Yes   Sig: Place 2 g onto the skin 4 times daily Apply to knees and shoulders.   losartan (COZAAR) 100 MG tablet 5/30/2019 at 0930 [facility] Yes Yes   Sig: Take 100 mg by mouth daily   temazepam (RESTORIL) 15 MG capsule prn med Nursing Home Yes Yes   Sig: Take 15 mg by mouth nightly as needed for sleep      Facility-Administered Medications: None     Allergies   Allergies   Allergen Reactions     Enalapril      No Clinical Screening - See Comments      Pine nuts       Physical Exam   Vital Signs: Temp: 98.6  F (37  C)   BP: 128/60 Pulse: 80   Resp: 18 SpO2: 95 % O2 Device: None (Room air)    Weight: 117 lbs 0 oz    General Appearance: Elderly female laying comfortably in bed.  Extremely hard of hearing as she does not have her hearing  aids in place.  Eyes: Evidence of prior cataract surgery bilaterally.  No scleral icterus.  HEENT: Normocephalic  Respiratory: Breath sounds clear bilaterally.  Somewhat distant.  Fair effort.  Cardiovascular: Regular rate and rhythm.  Soft early systolic murmur at apex  GI: Abdomen soft, nontender to palpation.  No palpable mass.  Skin: No rashes  Musculoskeletal: Frail in the setting of advanced age.  Neurologic: No focal neuro deficit is appreciated.  Patient with 5/5 plantar and dorsiflexion bilaterally, 5/5 strength in hip flexors bilaterally.  Tongue is midline.  Cranial nerves intact..  Psychiatric: Normal affect, pleasant    Data   Data reviewed today: I reviewed all medications, new labs and imaging results over the last 24 hours. I personally reviewed the brain MRI image(s) showing Right middle cerebral and cerebellar mass with associated vasogenic edema.    Recent Labs   Lab 05/30/19  1645   WBC 10.8   HGB 15.3   MCV 91         POTASSIUM 4.3   CHLORIDE 107   CO2 25   BUN 25   CR 0.98   ANIONGAP 8   ASHLEIGH 9.4   GLC 91   ALBUMIN 3.9   PROTTOTAL 8.1   BILITOTAL 0.6   ALKPHOS 95   ALT 56*   AST 39   TROPI <0.015

## 2019-05-31 NOTE — PLAN OF CARE
Pt presented with R leg weakness and R cheek numbness, found to have brain tumor. Pt placed on comfort care per wishes. Decadron 4mg q12h started to help with swelling surrounding tumor in hopes it will improve symptoms. Pt denies headache today. Up with 1, sera steady to the BSC. Tolerated regular diet for breakfast, declined lunch. Plan to discharge back to the [facility] with plans to enroll in hospice around 1615 with ride from son.

## 2019-05-31 NOTE — PLAN OF CARE
Pt here with possible CNS lymphoma, placed on comfort cares. A&O. [place], hearing aids at bedside. QIU. VSS. Regular diet. Takes pills crushed in applesauce. Very unsteady on feet. Up with sera steady and A2. C/o HA, HS tylenol and oxycodone given with pt relief. Seizure precautions in place. Plan for hem/onc and SW consult to discuss Hospice.

## 2019-05-31 NOTE — CONSULTS
Consultation    Angela Peralta MRN# [id number]   YOB: 1927 Age: 89 year old   Date of Admission: 5/30/2019  Requesting physician: Dr. Carrasquillo  Reason for consult: CNS lymphoma           Assessment and Plan:     Angela is a 89 year old admitted 5/30 with weakness and headache    1. CNS lymphoma  - Has seen Dr. Ashby previously (2017) and more recently Dr. Barbra Ruiz at [facility] (4/24/2018)  - Previous notes reviewed. Angela and her family do not want aggressive cares.  - MRI 5/30 shows 4.2 x 2.2 x 3.0 cm mass with vasogenic edema  - Family interested in comfort cares and hospice  - It sounds like it is possible for her to go back to [facility] with hospice care. This is important for her family.  - The question is about adding dexamethasone for symptom relief. I think that the benefits of steroids outweigh the risks. We talked about side effect such as insomnia and GI upset. She could get a 1 time 4mg IV dose, then start 4 mg twice daily depending on symptoms and medication tolerance.  - I will review further with Dr. Espino who will follow up with Angela later today  - I reviewed this with Angela and her son Carlos Alberto Arredondo RODERICK, [facility]  [phone]             Chief Complaint:   Numbness         History of Present Illness:   Angela is a 89 year old admitted 5/30 with weakness and headache    Angela presented 5/30 with increasing generalized weakness, but also right facial numbness, right side weakness and headache. Imaging in the ED included CT brain and MRI brain. The MRI showed a large mass with associated vasogenic edema. She was admitted for further treatment.    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 She has been followed by Ophthalmology and Oncology. In 2017, while she was living in [place] she was on hospice. She moved to [place] to be closer to family. Most recently she has seen Dr. Ruiz at [facility]. Angela and her family have been only  "interested in comfort care and hospice if disease progression was documented.    Angela is hard of hearing making history taking difficult. Most of the history is taken from her son Carlos Alberto. She has been increasingly weak recently. Additionally, she has had difficulty with ambulation due to right leg weakness. Right facial numbness has developed. There has been an increase in headache symptoms recently.    At the present moment she is resting comfortably without current concerns.         Physical Exam:   Vitals were reviewed  Blood pressure 143/77, pulse 70, temperature 98.5  F (36.9  C), temperature source Oral, resp. rate 12, height 1.575 m (5' 2\"), weight 51.7 kg (114 lb), SpO2 94 %.  Temperatures:  Current - Temp: 98.5  F (36.9  C); Max - Temp  Av.7  F (37.1  C)  Min: 98.5  F (36.9  C)  Max: 98.9  F (37.2  C)  Respiration range: Resp  Av  Min: 12  Max: 18  Pulse range: Pulse  Av  Min: 67  Max: 80  Blood pressure range: Systolic (24hrs), Av , Min:128 , Max:178   ; Diastolic (24hrs), Av, Min:60, Max:83    Pulse oximetry range: SpO2  Av.7 %  Min: 94 %  Max: 99 %    Intake/Output Summary (Last 24 hours) at 2019 1219  Last data filed at 2019 0759  Gross per 24 hour   Intake 30 ml   Output 300 ml   Net -270 ml     GENERAL: No acute distress.  SKIN: No rashes or jaundice.  HEENT: Normocephalic, atraumatic. Eyes anicteric. Oropharynx is clear.  HEART: Regular rate and rhythm with no murmurs.  LUNGS: Clear bilaterally.  ABDOMEN: Soft, nontender, nondistended with no palpable hepatosplenomegaly.  EXTREMITIES: No clubbing, cyanosis, or edema. Moves extremities spontaneously.  MENTAL: Orientation difficult with hearing loss.  NEURO: Cranial nerves II through XII grossly intact with no focal motor or sensory deficits. Hard of hearing.            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, scalp     Unspecified cerebral artery occlusion with cerebral infarction              Past Surgical History:   I have reviewed this patient's past surgical history  Past Surgical History:   Procedure Laterality Date     COLONOSCOPY       ENT SURGERY       GENITOURINARY SURGERY       GYN SURGERY       HYSTERECTOMY       PHACOEMULSIFICATION CLEAR CORNEA WITH STANDARD INTRAOCULAR LENS IMPLANT Left 8/25/2014    Procedure: PHACOEMULSIFICATION CLEAR CORNEA WITH STANDARD INTRAOCULAR LENS IMPLANT;  Surgeon: David Luu MD;  Location:  [facility]     PHACOEMULSIFICATION CLEAR CORNEA WITH STANDARD INTRAOCULAR LENS IMPLANT Left 8/25/2014    Procedure: PHACOEMULSIFICATION CLEAR CORNEA WITH STANDARD INTRAOCULAR LENS IMPLANT;  Surgeon: David Luu MD;  Location:  [facility]     PHACOEMULSIFICATION CLEAR CORNEA WITH STANDARD INTRAOCULAR LENS IMPLANT Right 8/11/2014    Procedure: PHACOEMULSIFICATION CLEAR CORNEA WITH STANDARD INTRAOCULAR LENS IMPLANT;  Surgeon: David Luu MD;  Location:  [facility]               Social History:   I have reviewed this patient's social history  Social History     Tobacco Use     Smoking status: Never Smoker     Smokeless tobacco: Never Used   Substance Use Topics     Alcohol use: Yes     Comment: wine daily             Family History:   I have reviewed this patient's family history  History reviewed. No pertinent family history.          Allergies:     Allergies   Allergen Reactions     Enalapril      No Clinical Screening - See Comments      Pine nuts             Medications:   I have reviewed this patient's current medications  Medications Prior to Admission   Medication Sig Dispense Refill Last Dose     acetaminophen (TYLENOL) 500 MG tablet Take 500 mg by mouth At Bedtime   5/29/2019 at 1930     acetaminophen  (TYLENOL) 500 MG tablet Take 500-1,000 mg by mouth 3 times daily as needed for mild pain   prn med     amLODIPine (NORVASC) 10 MG tablet Take 10 mg by mouth every evening    5/29/2019 at 1930     aspirin (ASA) 81 MG chewable tablet Take 81 mg by mouth daily   5/30/2019 at 0930     ATORVASTATIN CALCIUM PO Take 40 mg by mouth daily    5/30/2019 at 0930     citalopram (CELEXA) 20 MG tablet Take 20 mg by mouth every evening   5/29/2019 at 1930     diclofenac (VOLTAREN) 1 % topical gel Place 2 g onto the skin 4 times daily Apply to knees and shoulders.   5/30/2019 at 0930 & 1300     losartan (COZAAR) 100 MG tablet Take 100 mg by mouth daily   5/30/2019 at 0930     Multiple Vitamins-Minerals (PRESERVISION AREDS 2 PO) Take 1 capsule by mouth 2 times daily   5/30/2019 at 0930     temazepam (RESTORIL) 15 MG capsule Take 15 mg by mouth nightly as needed for sleep   prn med     Current Facility-Administered Medications Ordered in Epic   Medication Dose Route Frequency Last Rate Last Dose     acetaminophen (TYLENOL) tablet 500 mg  500 mg Oral At Bedtime   500 mg at 05/31/19 0005     acetaminophen (TYLENOL) tablet 500-1,000 mg  500-1,000 mg Oral TID PRN         amLODIPine (NORVASC) tablet 10 mg  10 mg Oral QPM   10 mg at 05/31/19 0005     aspirin (ASA) chewable tablet 81 mg  81 mg Oral Daily   81 mg at 05/31/19 0853     bisacodyl (DULCOLAX) Suppository 10 mg  10 mg Rectal Daily PRN         citalopram (celeXA) tablet 20 mg  20 mg Oral QPM   20 mg at 05/31/19 0005     lidocaine (LMX4) cream   Topical Q1H PRN         lidocaine 1 % 0.1-1 mL  0.1-1 mL Other Q1H PRN         losartan (COZAAR) tablet 100 mg  100 mg Oral Daily   100 mg at 05/31/19 0853     melatonin tablet 1 mg  1 mg Oral At Bedtime PRN         naloxone (NARCAN) injection 0.1-0.4 mg  0.1-0.4 mg Intravenous Q2 Min PRN         ondansetron (ZOFRAN-ODT) ODT tab 4 mg  4 mg Oral Q6H PRN        Or     ondansetron (ZOFRAN) injection 4 mg  4 mg Intravenous Q6H PRN          oxyCODONE IR (ROXICODONE) half-tab 2.5-5 mg  2.5-5 mg Oral Q3H PRN   2.5 mg at 05/31/19 0348     polyethylene glycol (MIRALAX/GLYCOLAX) Packet 17 g  17 g Oral Daily PRN         prochlorperazine (COMPAZINE) injection 5 mg  5 mg Intravenous Q6H PRN        Or     prochlorperazine (COMPAZINE) tablet 5 mg  5 mg Oral Q6H PRN        Or     prochlorperazine (COMPAZINE) Suppository 12.5 mg  12.5 mg Rectal Q12H PRN         senna-docusate (SENOKOT-S/PERICOLACE) 8.6-50 MG per tablet 1 tablet  1 tablet Oral BID PRN        Or     senna-docusate (SENOKOT-S/PERICOLACE) 8.6-50 MG per tablet 2 tablet  2 tablet Oral BID PRN         sodium chloride (PF) 0.9% PF flush 3 mL  3 mL Intracatheter q1 min prn         sodium chloride (PF) 0.9% PF flush 3 mL  3 mL Intracatheter Q8H   3 mL at 05/31/19 0858     temazepam (RESTORIL) capsule 15 mg  15 mg Oral At Bedtime PRN         No current [facility]-ordered outpatient medications on file.             Review of Systems:   The 10 point Review of Systems is negative other than noted in the HPI.            Data:   Data   Results for orders placed or performed during the hospital encounter of 05/30/19 (from the past 24 hour(s))   CBC with platelets differential   Result Value Ref Range    WBC 10.8 4.0 - 11.0 10e9/L    RBC Count 4.90 3.8 - 5.2 10e12/L    Hemoglobin 15.3 11.7 - 15.7 g/dL    Hematocrit 44.5 35.0 - 47.0 %    MCV 91 78 - 100 fl    MCH 31.2 26.5 - 33.0 pg    MCHC 34.4 31.5 - 36.5 g/dL    RDW 14.3 10.0 - 15.0 %    Platelet Count 248 150 - 450 10e9/L    Diff Method Automated Method     % Neutrophils 72.9 %    % Lymphocytes 16.4 %    % Monocytes 8.8 %    % Eosinophils 1.5 %    % Basophils 0.2 %    % Immature Granulocytes 0.2 %    Nucleated RBCs 0 0 /100    Absolute Neutrophil 7.9 1.6 - 8.3 10e9/L    Absolute Lymphocytes 1.8 0.8 - 5.3 10e9/L    Absolute Monocytes 1.0 0.0 - 1.3 10e9/L    Absolute Eosinophils 0.2 0.0 - 0.7 10e9/L    Absolute Basophils 0.0 0.0 - 0.2 10e9/L    Abs Immature Granulocytes  0.0 0 - 0.4 10e9/L    Absolute Nucleated RBC 0.0    Comprehensive metabolic panel   Result Value Ref Range    Sodium 140 133 - 144 mmol/L    Potassium 4.3 3.4 - 5.3 mmol/L    Chloride 107 94 - 109 mmol/L    Carbon Dioxide 25 20 - 32 mmol/L    Anion Gap 8 3 - 14 mmol/L    Glucose 91 70 - 99 mg/dL    Urea Nitrogen 25 7 - 30 mg/dL    Creatinine 0.98 0.52 - 1.04 mg/dL    GFR Estimate 50 (L) >60 mL/min/[1.73_m2]    GFR Estimate If Black 58 (L) >60 mL/min/[1.73_m2]    Calcium 9.4 8.5 - 10.1 mg/dL    Bilirubin Total 0.6 0.2 - 1.3 mg/dL    Albumin 3.9 3.4 - 5.0 g/dL    Protein Total 8.1 6.8 - 8.8 g/dL    Alkaline Phosphatase 95 40 - 150 U/L    ALT 56 (H) 0 - 50 U/L    AST 39 0 - 45 U/L   Troponin I   Result Value Ref Range    Troponin I ES <0.015 0.000 - 0.045 ug/L   EKG 12-lead, tracing only   Result Value Ref Range    Interpretation ECG Click View Image link to view waveform and result    UA with Microscopic reflex to Culture   Result Value Ref Range    Color Urine Yellow     Appearance Urine Slightly Cloudy     Glucose Urine Negative NEG^Negative mg/dL    Bilirubin Urine Negative NEG^Negative    Ketones Urine Negative NEG^Negative mg/dL    Specific Gravity Urine 1.016 1.003 - 1.035    Blood Urine Negative NEG^Negative    pH Urine 6.0 5.0 - 7.0 pH    Protein Albumin Urine 10 (A) NEG^Negative mg/dL    Urobilinogen mg/dL Normal 0.0 - 2.0 mg/dL    Nitrite Urine Negative NEG^Negative    Leukocyte Esterase Urine Trace (A) NEG^Negative    Source Midstream Urine     WBC Urine 5 0 - 5 /HPF    RBC Urine 1 0 - 2 /HPF    Bacteria Urine Many (A) NEG^Negative /HPF    Squamous Epithelial /HPF Urine 1 0 - 1 /HPF    Mucous Urine Present (A) NEG^Negative /LPF    Hyaline Casts 3 (H) 0 - 2 /LPF   XR Chest 2 Views    Narrative    XR CHEST 2 VW 5/30/2019 6:02 PM    HISTORY: Weakness.     COMPARISON: August 7, 2013.       Impression    IMPRESSION: Right lateral lung base granuloma as before. No new focal  pulmonary opacities otherwise. No  pleural effusions or pneumothorax.  Stable heart and mediastinum.     SOCORRO MCELROY MD   CT Head w/o Contrast    Narrative    CT OF THE HEAD WITHOUT CONTRAST 5/30/2019 6:11 PM     COMPARISON: Brain MRI 3/21/2017.    HISTORY: Focal neuro deficit, > 6 hours, stroke suspected; right  facial numbness, right leg weakness.    TECHNIQUE: 5 mm thick axial CT images of the head were acquired  without IV contrast material.    FINDINGS: There is new hypodensity in the superior medial aspect of  the right cerebellar hemisphere that likely represents a subacute  ischemic infarct although edema from tumor or infection cannot be  completely excluded. There is mild diffuse cerebral volume loss. There  are extensive confluent areas of decreased density in the cerebral  white matter bilaterally that are consistent with sequela of chronic  small vessel ischemic disease.    The ventricles and basal cisterns are within normal limits in  configuration given the degree of cerebral volume loss.  There is no  midline shift. There are no extra-axial fluid collections.    No intracranial hemorrhage, mass or recent infarct.    The visualized paranasal sinuses are well-aerated. There is no  mastoiditis. There are no fractures of the visualized bones.      Impression    IMPRESSION:   1. Probable recent ischemic infarct involving the superior aspect of  the right cerebellar hemisphere although edema from tumor or infection  cannot be completely excluded. Brain MRI with contrast may be helpful  for further evaluation.  2. Diffuse cerebral volume loss and cerebral white matter changes  consistent with chronic small vessel ischemic disease.    Radiation dose for this scan was reduced using automated exposure  control, adjustment of the mA and/or kV according to patient size, or  iterative reconstruction technique.      HUGO DE MD   MR Brain w/o & w Contrast    Narrative    MRI OF THE BRAIN WITHOUT AND WITH CONTRAST 5/30/2019 8:39 PM      COMPARISON: Head CT same day.    HISTORY: Focal neurological deficit, > six hours, stroke suspected;  abnormal CT.     TECHNIQUE: Axial diffusion-weighted with ADC map, axial T2-weighted  with fat saturation, axial T1-weighted, axial turboFLAIR and coronal  T1-weighted images of the brain were acquired without intravenous  contrast. Following intravenous administration of gadolinium (5 mL  Gadavist), axial T1-weighted images of the brain were acquired.     FINDINGS: There is a heterogeneously enhancing, T1 hypointense,  heterogeneously T2 hypointense mass in the superior aspect of the  right cerebellar hemisphere extending through the right middle  cerebellar peduncle to involve the right posterolateral aspect of the  vesna. This mass measures 4.2 x 2.2 cm in the oblique AP and oblique  transverse dimensions respectively and measures roughly 3 cm in the  cephalocaudad dimension. There is vasogenic edema surrounding the  mass. There is no evidence for hemorrhage associated with the mass.  Differential diagnosis includes primary brain tumor (glioma versus  lymphoma) or metastatic disease. No other intracranial lesions noted.    There is moderate diffuse cerebral volume loss. There are extensive  confluent periventricular areas of abnormal T2 signal hyperintensity  in the cerebral white matter bilaterally that are consistent with  sequela of chronic small vessel ischemic disease. The ventricles and  basal cisterns are within normal limits in configuration given the  degree of cerebral volume loss. There is no midline shift. There are  no extra-axial fluid collections. There is no evidence for stroke or  acute intracranial hemorrhage.     There is no sinusitis or mastoiditis.      Impression    IMPRESSION:   1. Enhancing 4.2 x 2.2 x roughly 3.0 cm enhancing mass in the superior  aspect of the right cerebral hemisphere extending through the middle  cerebellar peduncle to involve the right hemipons.  Differential  diagnosis includes primary brain tumor (glioma or lymphoma) versus  metastatic disease.  2. Diffuse cerebral volume loss and cerebral white matter changes  consistent with chronic small vessel ischemic disease.     HUGO DE MD

## 2019-05-31 NOTE — PLAN OF CARE
RECEIVING UNIT ED HANDOFF REVIEW    ED Nurse Handoff Report was reviewed by: Sindhu Leslie on May 30, 2019 at 11:18 PM

## 2019-05-31 NOTE — CONSULTS
"Care Transition Initial Assessment - SW     Met with: PATIENT,FAMILY    Active Problems:    CNS lymphoma (H)       DATA  Lives With: facility resident         Description of Support System: Supportive, Involved  Who is your support system?: Children  Support Assessment: Adequate family and caregiver support, Adequate social supports.   Identified issues/concerns regarding health management: Need for increased services regarding Hospice cares at time of discharge.    ASSESSMENT  Cognitive Status:  Awake, alert, oriented  Concerns to be addressed: Hospice     SW reviewed chart and met with patient and son to discuss Hospice/discharge plan. Patient was admitted 5/30/19. Anticipated discharge 5/31/19. SW introduced self and role. Patient resides at [facility] receiving ADL cares, per nurse Victorina (). Patient and family have met with oncology and decided on comfort cares and a return to [facility] with orders to enroll onto Hospice. Per Victorina, they use [facility] and can take patient back anytime today or this weekend if \"[facility] is on board and can enroll patient within a couple of hours of her return\". AVILA spoke with Юлия at [facility] () who confirmed an NP needs to do a face to face interview with patient prior to enrolling, as pt has been on 2 Hospice benefit periods prior. Per Юлия, their NP can see patient by the end of the work day today at [facility], which will allow their on call staff to complete the enrollment paperwork by 6pm. AVILA spoke with Victorina who confirms this plan and stated they do not need any medications filled here prior to discharge. Son and pt in agreement as well; son will transport at 1600. AVILA faxed orders and script to both [facility] () and [facility] ().       PLAN  Financial costs for the patient includes: N/A .  Patient given options and choices for discharge Yes .  Patient/family is agreeable to the plan?  " YES  Patient Goals and Preferences: Discharge back to [facility] with Hospice .  Patient anticipates discharging to:  [facility] .    No further SW interventions anticipated at this time.    ILEANA Boykin